# Patient Record
Sex: FEMALE | NOT HISPANIC OR LATINO | URBAN - NONMETROPOLITAN AREA
[De-identification: names, ages, dates, MRNs, and addresses within clinical notes are randomized per-mention and may not be internally consistent; named-entity substitution may affect disease eponyms.]

---

## 2019-06-18 ENCOUNTER — APPOINTMENT (RX ONLY)
Dept: URBAN - NONMETROPOLITAN AREA CLINIC 31 | Facility: CLINIC | Age: 39
Setting detail: DERMATOLOGY
End: 2019-06-18

## 2019-06-18 VITALS — WEIGHT: 130 LBS | HEIGHT: 64 IN

## 2019-06-18 DIAGNOSIS — L57.8 OTHER SKIN CHANGES DUE TO CHRONIC EXPOSURE TO NONIONIZING RADIATION: ICD-10-CM

## 2019-06-18 DIAGNOSIS — D22 MELANOCYTIC NEVI: ICD-10-CM

## 2019-06-18 DIAGNOSIS — Z80.8 FAMILY HISTORY OF MALIGNANT NEOPLASM OF OTHER ORGANS OR SYSTEMS: ICD-10-CM

## 2019-06-18 PROBLEM — J45.909 UNSPECIFIED ASTHMA, UNCOMPLICATED: Status: ACTIVE | Noted: 2019-06-18

## 2019-06-18 PROBLEM — L85.3 XEROSIS CUTIS: Status: ACTIVE | Noted: 2019-06-18

## 2019-06-18 PROBLEM — D22.5 MELANOCYTIC NEVI OF TRUNK: Status: ACTIVE | Noted: 2019-06-18

## 2019-06-18 PROCEDURE — ? COUNSELING

## 2019-06-18 PROCEDURE — 99214 OFFICE O/P EST MOD 30 MIN: CPT

## 2019-06-18 PROCEDURE — ? IN-HOUSE DISPENSING PHARMACY

## 2019-06-18 PROCEDURE — ? SUNSCREEN RECOMMENDATIONS

## 2019-06-18 ASSESSMENT — LOCATION ZONE DERM
LOCATION ZONE: ARM
LOCATION ZONE: TRUNK

## 2019-06-18 ASSESSMENT — LOCATION DETAILED DESCRIPTION DERM
LOCATION DETAILED: LEFT SUPERIOR UPPER BACK
LOCATION DETAILED: LEFT POSTERIOR SHOULDER

## 2019-06-18 ASSESSMENT — LOCATION SIMPLE DESCRIPTION DERM
LOCATION SIMPLE: LEFT SHOULDER
LOCATION SIMPLE: LEFT UPPER BACK

## 2019-06-18 NOTE — PROCEDURE: MIPS QUALITY
Quality 265: Biopsy Follow-Up: Biopsy results reviewed, communicated, tracked, and documented
Quality 128: Preventive Care And Screening: Body Mass Index (Bmi) Screening And Follow-Up Plan: BMI is documented within normal parameters and no follow-up plan is required.
Quality 130: Documentation Of Current Medications In The Medical Record: Current Medications Documented
Quality 226: Preventive Care And Screening: Tobacco Use: Screening And Cessation Intervention: Patient screened for tobacco use and is an ex/non-smoker
Detail Level: Generalized
Quality 431: Preventive Care And Screening: Unhealthy Alcohol Use - Screening: Patient screened for unhealthy alcohol use using a single question and scores less than 2 times per year

## 2019-06-18 NOTE — PROCEDURE: IN-HOUSE DISPENSING PHARMACY
Product 46 Application Directions: Apply once to twice daily for 3 months and then stop for 2 months before resuming
Product 78 Unit Type: mg
Product 61 Refills: 3
Product 36 Price/Unit (In Dollars): 50.00
Product 20 Refills: 0
Product 11 Price/Unit (In Dollars): 45.00
Name Of Product 41: AK Imiquimod Gel
Product 22 Amount/Unit (Numbers Only): 60
Product 21 Refills: 2
Product 26 Price/Unit (In Dollars): 40.00
Product 5 Amount/Unit (Numbers Only): 120
Product 31 Amount/Unit (Numbers Only): 30
Product 32 Unit Type: grams
Name Of Product 23: Clobetasol Ointment
Name Of Product 27: Hydrocortisone 2.5% / Tranilast 0.5% / Levo 2%
Name Of Product 1: Sodium sulfacetamide 8%
Product 9 Application Directions: Apply pea sized amount at night to entire face. Start every other night and increase to nightly if well-tolerated
Send Charges To Patient Encounter: Yes
Name Of Product 24: Fluocinolone Scalp and Body Oil
Name Of Product 22: Clobetasol Cream
Product 8 Application Directions: Apply nightly to face.
Name Of Product 5: BP 8% Acne Wash
Product 29 Refills: 5
Name Of Product 46: Skin brightening hydroquinone 8% combination sensitive skin
Product 44 Amount/Unit (Numbers Only): 240
Product 6 Refills: 4
Product 32 Application Directions: Twice a day to affected areas of vitiligo on hands
Name Of Product 9: Hydrating 0.05% Tretinoin Cream
Name Of Product 36: Clobetasol solution
Name Of Product 43: Alopecia Topical Solution for Men
Name Of Product 11: Metronidazole Gel
Name Of Product 31: Urea 40%
Product 12 Application Directions: Apply daily to face.
Name Of Product 4: BP 2.5% / Clindamycin Combination Gel
Name Of Product 32: Tacrolimus 0.1% Ointment
Product 44 Price/Unit (In Dollars): 90.00
Name Of Product 25: Fluocinonide Cream
Name Of Product 45: Clobetasol/Calcipotriene Solution
Product 12 Refills: 1
Name Of Product 10: Female Hormonal Acne Gel
Product 1 Price/Unit (In Dollars): 35.00
Product 28 Price/Unit (In Dollars): 30.00
Name Of Product 21: Anti-Fungal Shampoo
Product 29 Price/Unit (In Dollars): 65.00
Name Of Product 2: Acne Triple Gel Combination
Name Of Product 6: Clindamycin Gel
Name Of Product 29: Triamcinolone Cream
Name Of Product 61: Skin Brightening HQ 8% Sensitive Skin
Product 21 Application Directions: Wash and leave in for 15 mins then wash out.
Product 42 Application Directions: Apply every third night for two weeks.  Then apply every other night for two weeks and then nightly as tolerated.
Name Of Product 12: Rosacea Triple Combination Gel
Name Of Product 3: Adapalene 0.3% Gel
Name Of Product 26: Ketoconazole/ hydrocortisone cream
Name Of Product 7: Dapsone 6% Gel
Name Of Product 8: Tretinoin 0.025% / Clindamycin Combination Cream
Detail Level: Zone
Name Of Product 44: Anti-aging Body Tretinoin 0.05% Cream
Product 4 Application Directions: Apply bid to face
Name Of Product 42: Skin Brightening Hydroquinone 8% Combination